# Patient Record
Sex: MALE | URBAN - METROPOLITAN AREA
[De-identification: names, ages, dates, MRNs, and addresses within clinical notes are randomized per-mention and may not be internally consistent; named-entity substitution may affect disease eponyms.]

---

## 2023-04-24 ENCOUNTER — TELEPHONE (OUTPATIENT)
Dept: UROLOGY | Facility: MEDICAL CENTER | Age: 43
End: 2023-04-24

## 2023-04-24 NOTE — TELEPHONE ENCOUNTER
Please Triage -   New Patient-     What is the reason for the patients appointment? Patient's wife called to schedule vasectomy consult       Imaging/Lab Results:      Do we accept the patient's insurance or is the patient Self-Pay? Provider & Plan: [employer]   Member ID#: Has the patient had any previous urologist(s)?no        Have patient records been requested?no       Has the patient had any outside testing done? No     Does the patient have a personal history of cancer?       Patient can be reached at :

## 2023-05-18 ENCOUNTER — OFFICE VISIT (OUTPATIENT)
Dept: UROLOGY | Facility: CLINIC | Age: 43
End: 2023-05-18

## 2023-05-18 VITALS
OXYGEN SATURATION: 99 % | DIASTOLIC BLOOD PRESSURE: 98 MMHG | WEIGHT: 175 LBS | HEIGHT: 69 IN | BODY MASS INDEX: 25.92 KG/M2 | HEART RATE: 67 BPM | SYSTOLIC BLOOD PRESSURE: 148 MMHG

## 2023-05-18 DIAGNOSIS — Z30.2 ENCOUNTER FOR STERILIZATION: Primary | ICD-10-CM

## 2023-05-18 RX ORDER — ALPRAZOLAM 2 MG/1
2 TABLET ORAL ONCE
Qty: 1 TABLET | Refills: 0 | Status: SHIPPED | OUTPATIENT
Start: 2023-05-18 | End: 2023-05-18

## 2023-05-18 NOTE — PROGRESS NOTES
UROLOGY NEW CONSULT NOTE     CHIEF COMPLAINT   Nuvia Garcia is a 40 y o  male with a complaint of   Chief Complaint   Patient presents with   • Vasectomy   • Consult       History of Present Illness:   Nuvia Garcia is a 40 y o  male here with desire for elective sterilization  Patient has 2 children at home age 3 and 11  Both he and his wife are in their 45s and interested in permanent sterilization  No prior surgery to groin or testicles, works and owns a [address]  Past Medical History:   History reviewed  No pertinent past medical history  PAST SURGICAL HISTORY:     Past Surgical History:   Procedure Laterality Date   • WISDOM TOOTH EXTRACTION Bilateral        CURRENT MEDICATIONS:     Current Outpatient Medications   Medication Sig Dispense Refill   • ALPRAZolam (XANAX) 2 MG tablet Take 1 tablet (2 mg total) by mouth once for 1 dose One tab prior to procedure, 30 mins 1 tablet 0     No current facility-administered medications for this visit         ALLERGIES:     Allergies   Allergen Reactions   • Prednisone Other (See Comments)       SOCIAL HISTORY:     Social History     Socioeconomic History   • Marital status: /Civil Union     Spouse name: None   • Number of children: None   • Years of education: None   • Highest education level: None   Occupational History   • None   Tobacco Use   • Smoking status: Former     Types: Cigarettes   • Smokeless tobacco: Never   Vaping Use   • Vaping Use: Never used   Substance and Sexual Activity   • Alcohol use: Yes     Comment: socially   • Drug use: Not Currently   • Sexual activity: None   Other Topics Concern   • None   Social History Narrative   • None     Social Determinants of Health     Financial Resource Strain: Not on file   Food Insecurity: Not on file   Transportation Needs: Not on file   Physical Activity: Not on file   Stress: Not on file   Social Connections: Not on file   Intimate Partner Violence: Not on file   Housing Stability: "Not on file       SOCIAL HISTORY:     Family History   Problem Relation Age of Onset   • No Known Problems Father    • No Known Problems Mother    • Breast cancer Maternal Grandmother        REVIEW OF SYSTEMS:     Review of Systems   Constitutional: Negative  Respiratory: Negative  Cardiovascular: Negative  Gastrointestinal: Negative  Genitourinary: Negative  Negative for scrotal swelling and testicular pain  Musculoskeletal: Negative  Skin: Negative  Psychiatric/Behavioral: Negative  PHYSICAL EXAM:     /98 (BP Location: Left arm, Patient Position: Sitting, Cuff Size: Adult)   Pulse 67   Ht 5' 9\" (1 753 m)   Wt 79 4 kg (175 lb)   SpO2 99%   BMI 25 84 kg/m²     Physical Exam  Vitals reviewed  HENT:      Head: Normocephalic  Nose: Nose normal       Mouth/Throat:      Mouth: Mucous membranes are moist    Eyes:      Pupils: Pupils are equal, round, and reactive to light  Cardiovascular:      Rate and Rhythm: Normal rate  Pulmonary:      Effort: Pulmonary effort is normal    Abdominal:      General: Abdomen is flat  Genitourinary:     Penis: Normal        Testes: Normal    Musculoskeletal:         General: Normal range of motion  Cervical back: Normal range of motion  Skin:     General: Skin is warm  Neurological:      General: No focal deficit present  Mental Status: He is alert  Psychiatric:         Mood and Affect: Mood normal          LABS:     CBC: No results found for: WBC, HGB, HCT, MCV, PLT    BMP: No results found for: GLUCOSE, CALCIUM, NA, K, CO2, CL, BUN, CREATININE    ASSESSMENT:     40 y o  male  with desire for elective sterilization    PLAN:     The patient presents requesting elective sterilization vasectomy  We discussed that vasectomy is in operation performed in the office in order to provide elective sterilization  This procedure should be considered a permanent option   Although there are subspecialists who perform vasectomy " reversals, these operations are not 100% successful and are often not covered by insurance meaning they can come with a large out-of-pocket cost  The patient understands this  We reviewed the procedure in depth  Risk and benefits of the procedure were discussed and reviewed  Rates of surgical complications such as hematoma and infection very around 1-2%  Chronic scrotal pain associated again with about 1-2% of men  Informed consent was obtained in the office today  The patient was prescribed a benzodiazepine to take one hour prior to the procedure to assist with his comfort  He understands that he will require transportation to and from the office that day if he is to use the benzodiazepine  He also understands he will require semen analysis 8 weeks post procedure to ensure full sterilization  There are risks of pregnancy after the procedure in 1/2000 patients  Repeat vasectomies are necessary in less than 1% of procedures  Avoidance of ejaculation for 1 week after the procedure  A number of ejaculations in the interim between procedure and 8 week semen analysis will be discussed after pathology returned  In the interim, he will require contraception during intercourse to avoid an undesired pregnancy  Usually, patients are out of work for 2-3 days  We recommend tight fitting scrotal support following the procedure along with ice packs applied to the scrotum 15 minutes on and 15 minutes off for the first 24 hours  We discussed that we do send the patient home with short course of narcotic pain medication  After this discussion, the patient agrees to proceed  We will schedule him in the near future

## 2023-06-26 ENCOUNTER — TELEPHONE (OUTPATIENT)
Dept: UROLOGY | Facility: AMBULATORY SURGERY CENTER | Age: 43
End: 2023-06-26

## 2023-06-26 DIAGNOSIS — Z30.2 ENCOUNTER FOR STERILIZATION: ICD-10-CM

## 2023-06-26 RX ORDER — ALPRAZOLAM 1 MG/1
1 TABLET ORAL ONCE
Qty: 1 TABLET | Refills: 0 | Status: SHIPPED | OUTPATIENT
Start: 2023-06-26 | End: 2023-06-26

## 2023-06-26 NOTE — TELEPHONE ENCOUNTER
Voicemail left for patient's wife, Jeanie Ochoa, stating a refill has been sent to the pharmacy  Office prescribing 1mg instead of the 2mg  Asked Jeanie Ochoa to call the office with any questions

## 2023-06-26 NOTE — TELEPHONE ENCOUNTER
Pt under care of Dr Gabbie Hua    Pt calling due to: pt wife calling in need of ALPRAZolam Percell Lesches) 2 MG tablet     Due to not getting picked up at pharmacy and pharmacy restocked medication     [id number], [place] - [address]   [address], [facility] [place] [address]   Phone:  [phone]  Fax:  [phone]     Pt can be reached at: [address] (wife)

## 2023-08-11 ENCOUNTER — PROCEDURE VISIT (OUTPATIENT)
Dept: UROLOGY | Facility: CLINIC | Age: 43
End: 2023-08-11
Payer: COMMERCIAL

## 2023-08-11 VITALS
OXYGEN SATURATION: 99 % | DIASTOLIC BLOOD PRESSURE: 80 MMHG | HEART RATE: 76 BPM | SYSTOLIC BLOOD PRESSURE: 126 MMHG | HEIGHT: 69 IN | BODY MASS INDEX: 25.92 KG/M2 | WEIGHT: 175 LBS

## 2023-08-11 DIAGNOSIS — Z30.2 ENCOUNTER FOR STERILIZATION: Primary | ICD-10-CM

## 2023-08-11 PROCEDURE — 55250 REMOVAL OF SPERM DUCT(S): CPT | Performed by: UROLOGY

## 2023-08-11 PROCEDURE — 88302 TISSUE EXAM BY PATHOLOGIST: CPT | Performed by: STUDENT IN AN ORGANIZED HEALTH CARE EDUCATION/TRAINING PROGRAM

## 2023-08-11 RX ORDER — OXYCODONE AND ACETAMINOPHEN 2.5; 325 MG/1; MG/1
1 TABLET ORAL EVERY 4 HOURS PRN
Qty: 5 TABLET | Refills: 0 | Status: SHIPPED | OUTPATIENT
Start: 2023-08-11

## 2023-08-11 RX ORDER — OXYCODONE HYDROCHLORIDE AND ACETAMINOPHEN 5; 325 MG/1; MG/1
1 TABLET ORAL EVERY 4 HOURS PRN
Qty: 5 TABLET | Refills: 0 | Status: CANCELLED | OUTPATIENT
Start: 2023-08-11

## 2023-08-11 NOTE — PROGRESS NOTES
Vasectomy     Date/Time 8/11/2023 2:45 PM     Performed by  Armand Nielsen MD   Authorized by Armand Nielsen MD     Universal Protocol   Timeout called at: 8/11/2023 4:18 PM.      Local anesthesia used: yes     Anesthesia   Local anesthesia used: yes  Local Anesthetic: lidocaine 2% without epinephrine     Sedation   Patient sedated: yes  Sedation type: anxiolysis        Specimen: yes    Culture: no   Procedure Details   Procedure Notes:   Vasectomy Procedure Note:  Risks and benefits of vasectomy were previously discussed. Informed consent was obtained at his prior office visit. The patient has taken Ativan as prescribed. The patient was placed in the supine position. His genitalia was prepped with a Betadine solution and draped in a sterile fashion. The left vas deferens was grasped and presented to the superficial left scrotum. 1% lidocaine mixed with 0.5% Marcaine plain was used to anesthetize the skin, subcutaneous tissue, and left vas deferens. A scalpeless opening was made in the scrotal skin. The left vas deferens was grasped and presented into the surgical field. A #15 blade was used to make a longitudinal incision in the sheath of the vas deferens. The vas itself was then dissected free from the surrounding tissue. Clamps were placed proximally and distally and a 1 cm segment of the vas deferens was excised. Silk ties were applied and exposed ends were fulgurated. The proximal vas deferens was dislodged from the clamp and some dissection had to be performed to allow suture ligation of this left proximal vas deferens stump. After suture was placed, hemostasis was excellent. The vas was returned to its natural anatomic position. The same procedure was then repeated on the patient's right side. Less than 20cc total local anesthesia instilled. Chromic suture was placed through the skin and dartos to reapproximate both defects.  Betadine was removed and Bacitracin ointment was applied. The patient tolerated the procedure well.   Patient Transportation: confirmed  Patient tolerance: patient tolerated the procedure well with no immediate complications

## 2023-08-11 NOTE — PROGRESS NOTES
UROLOGY PROCEDURE NOTE     CHIEF COMPLAINT   Carson Fajardo is a 40 y.o. male with a complaint of   Chief Complaint   Patient presents with   • Vasectomy       History of Present Illness:   Carson Fajardo is a 40 y.o. male here with desire for elective sterilization. Patient has 2 children at home age 3 and 11. Both he and his wife are in their 45s and interested in permanent sterilization. No prior surgery to groin or testicles, works and owns a [employer]. Returns for procedure. Past Medical History:   History reviewed. No pertinent past medical history. PAST SURGICAL HISTORY:     Past Surgical History:   Procedure Laterality Date   • VASECTOMY  08/11/2023    Dr. Phu Nogueira   • WISDOM TOOTH EXTRACTION Bilateral        CURRENT MEDICATIONS:     Current Outpatient Medications   Medication Sig Dispense Refill   • oxyCODONE-acetaminophen (Percocet) 2.5-325 MG per tablet Take 1 tablet by mouth every 4 (four) hours as needed for moderate pain Max Daily Amount: 6 tablets 5 tablet 0     No current facility-administered medications for this visit.        ALLERGIES:     Allergies   Allergen Reactions   • Prednisone Other (See Comments)       SOCIAL HISTORY:     Social History     Socioeconomic History   • Marital status: /Civil Union     Spouse name: None   • Number of children: None   • Years of education: None   • Highest education level: None   Occupational History   • None   Tobacco Use   • Smoking status: Former     Types: Cigarettes   • Smokeless tobacco: Never   Vaping Use   • Vaping Use: Never used   Substance and Sexual Activity   • Alcohol use: Yes     Comment: socially   • Drug use: Not Currently   • Sexual activity: None   Other Topics Concern   • None   Social History Narrative   • None     Social Determinants of Health     Financial Resource Strain: Not on file   Food Insecurity: Not on file   Transportation Needs: Not on file   Physical Activity: Not on file   Stress: Not on file Social Connections: Not on file   Intimate Partner Violence: Not on file   Housing Stability: Not on file       SOCIAL HISTORY:     Family History   Problem Relation Age of Onset   • No Known Problems Father    • No Known Problems Mother    • Breast cancer Maternal Grandmother        REVIEW OF SYSTEMS:     Review of Systems   Constitutional: Negative. Respiratory: Negative. Cardiovascular: Negative. Gastrointestinal: Negative. Genitourinary: Negative. Negative for scrotal swelling and testicular pain. Musculoskeletal: Negative. Skin: Negative. Psychiatric/Behavioral: Negative. PHYSICAL EXAM:     /80 (BP Location: Left arm, Patient Position: Sitting, Cuff Size: Adult)   Pulse 76   Ht 5' 9" (1.753 m)   Wt 79.4 kg (175 lb)   SpO2 99%   BMI 25.84 kg/m²     Physical Exam  Vitals reviewed. HENT:      Head: Normocephalic. Nose: Nose normal.      Mouth/Throat:      Mouth: Mucous membranes are moist.   Eyes:      Pupils: Pupils are equal, round, and reactive to light. Cardiovascular:      Rate and Rhythm: Normal rate. Pulmonary:      Effort: Pulmonary effort is normal.   Abdominal:      General: Abdomen is flat. Genitourinary:     Penis: Normal.       Testes: Normal.   Musculoskeletal:         General: Normal range of motion. Cervical back: Normal range of motion. Skin:     General: Skin is warm. Neurological:      General: No focal deficit present. Mental Status: He is alert. Psychiatric:         Mood and Affect: Mood normal.         LABS:     CBC: No results found for: "WBC", "HGB", "HCT", "MCV", "PLT"    BMP: No results found for: "GLUCOSE", "CALCIUM", "NA", "K", "CO2", "CL", "BUN", "CREATININE"     PROCEDURE:     SEE NOTE    ASSESSMENT:     40 y.o. male  with desire for elective sterilization    PLAN:       The patient is status post vasectomy. I recommended rest, ice, and scrotal support.  The patient has been counseled that unless he is having major issues, he will not return for a post vasectomy check. He will contact us with any significant swelling bruising or concerns for underlying infection. I have prescribed Norco to take as needed. The patient understands that he is not yet considered sterile. We will contact him with results of his I have recommended semen analyses at 8 weeks post procedure. In the interim I have recommended contraception to avoid an undesired pregnancy. Patient will need to ejaculate 20-25 times prior to semen analysis to ensure accurate specimen. We have given patient information for scheduling of the 8 week post vasectomy semen testing.

## 2023-08-11 NOTE — PATIENT INSTRUCTIONS
The patient is status post vasectomy. I recommended rest, ice, and scrotal support. The patient has been counseled that unless he is having major issues, he will not return for a post vasectomy check. He will contact us with any significant swelling bruising or concerns for underlying infection. I have prescribed Norco to take as needed. The patient understands that he is not yet considered sterile. We will contact him with results of his I have recommended semen analyses at 8 weeks post procedure. In the interim I have recommended contraception to avoid an undesired pregnancy. Patient will need to ejaculate 20-25 times prior to semen analysis to ensure accurate specimen. We have given patient information for scheduling of the 8 week post vasectomy semen testing.

## 2023-08-17 PROCEDURE — 88302 TISSUE EXAM BY PATHOLOGIST: CPT | Performed by: STUDENT IN AN ORGANIZED HEALTH CARE EDUCATION/TRAINING PROGRAM
